# Patient Record
Sex: FEMALE | Race: WHITE | Employment: OTHER | URBAN - METROPOLITAN AREA
[De-identification: names, ages, dates, MRNs, and addresses within clinical notes are randomized per-mention and may not be internally consistent; named-entity substitution may affect disease eponyms.]

---

## 2019-10-08 ENCOUNTER — APPOINTMENT (OUTPATIENT)
Dept: GENERAL RADIOLOGY | Age: 79
End: 2019-10-08
Payer: MEDICARE

## 2019-10-08 ENCOUNTER — HOSPITAL ENCOUNTER (OUTPATIENT)
Age: 79
Setting detail: OBSERVATION
Discharge: HOME | End: 2019-10-09
Attending: EMERGENCY MEDICINE | Admitting: INTERNAL MEDICINE
Payer: MEDICARE

## 2019-10-08 DIAGNOSIS — R55 SYNCOPE, UNSPECIFIED SYNCOPE TYPE: Primary | ICD-10-CM
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
## 2019-10-08 PROBLEM — R42 POSITIONAL LIGHTHEADEDNESS: Status: ACTIVE | Noted: 2019-10-08

## 2019-10-08 LAB
ALBUMIN SERPL-MCNC: 4.4 GM/DL (ref 3.4–5)
ALP BLD-CCNC: 82 IU/L (ref 40–128)
ALT SERPL-CCNC: 28 U/L (ref 10–40)
ANION GAP SERPL CALCULATED.3IONS-SCNC: 13 MMOL/L (ref 4–16)
AST SERPL-CCNC: 33 IU/L (ref 15–37)
BASOPHILS ABSOLUTE: 0.1 K/CU MM
BASOPHILS RELATIVE PERCENT: 0.7 % (ref 0–1)
BILIRUB SERPL-MCNC: 0.3 MG/DL (ref 0–1)
BUN BLDV-MCNC: 23 MG/DL (ref 6–23)
CALCIUM SERPL-MCNC: 9.6 MG/DL (ref 8.3–10.6)
CHLORIDE BLD-SCNC: 99 MMOL/L (ref 99–110)
CO2: 25 MMOL/L (ref 21–32)
CREAT SERPL-MCNC: 0.9 MG/DL (ref 0.6–1.1)
DIFFERENTIAL TYPE: ABNORMAL
EOSINOPHILS ABSOLUTE: 0.2 K/CU MM
EOSINOPHILS RELATIVE PERCENT: 2 % (ref 0–3)
GFR AFRICAN AMERICAN: >60 ML/MIN/1.73M2
GFR NON-AFRICAN AMERICAN: >60 ML/MIN/1.73M2
GLUCOSE BLD-MCNC: 163 MG/DL (ref 70–99)
HCT VFR BLD CALC: 38.6 % (ref 37–47)
HEMOGLOBIN: 12 GM/DL (ref 12.5–16)
IMMATURE NEUTROPHIL %: 0.3 % (ref 0–0.43)
LYMPHOCYTES ABSOLUTE: 1.9 K/CU MM
LYMPHOCYTES RELATIVE PERCENT: 24.4 % (ref 24–44)
MCH RBC QN AUTO: 31.4 PG (ref 27–31)
MCHC RBC AUTO-ENTMCNC: 31.1 % (ref 32–36)
MCV RBC AUTO: 101 FL (ref 78–100)
MONOCYTES ABSOLUTE: 0.6 K/CU MM
MONOCYTES RELATIVE PERCENT: 7.2 % (ref 0–4)
NUCLEATED RBC %: 0 %
PDW BLD-RTO: 12.5 % (ref 11.7–14.9)
PLATELET # BLD: 234 K/CU MM (ref 140–440)
PMV BLD AUTO: 11.4 FL (ref 7.5–11.1)
POTASSIUM SERPL-SCNC: 4 MMOL/L (ref 3.5–5.1)
PRO-BNP: 1022 PG/ML
RBC # BLD: 3.82 M/CU MM (ref 4.2–5.4)
SEGMENTED NEUTROPHILS ABSOLUTE COUNT: 5 K/CU MM
SEGMENTED NEUTROPHILS RELATIVE PERCENT: 65.4 % (ref 36–66)
SODIUM BLD-SCNC: 137 MMOL/L (ref 135–145)
TOTAL IMMATURE NEUTOROPHIL: 0.02 K/CU MM
TOTAL NUCLEATED RBC: 0 K/CU MM
TOTAL PROTEIN: 7.3 GM/DL (ref 6.4–8.2)
TROPONIN T: <0.01 NG/ML
WBC # BLD: 7.7 K/CU MM (ref 4–10.5)

## 2019-10-08 PROCEDURE — 71046 X-RAY EXAM CHEST 2 VIEWS: CPT

## 2019-10-08 PROCEDURE — G0378 HOSPITAL OBSERVATION PER HR: HCPCS

## 2019-10-08 PROCEDURE — 99284 EMERGENCY DEPT VISIT MOD MDM: CPT

## 2019-10-08 PROCEDURE — 2580000003 HC RX 258: Performed by: NURSE PRACTITIONER

## 2019-10-08 PROCEDURE — 80053 COMPREHEN METABOLIC PANEL: CPT

## 2019-10-08 PROCEDURE — 36415 COLL VENOUS BLD VENIPUNCTURE: CPT

## 2019-10-08 PROCEDURE — 84484 ASSAY OF TROPONIN QUANT: CPT

## 2019-10-08 PROCEDURE — 6370000000 HC RX 637 (ALT 250 FOR IP): Performed by: NURSE PRACTITIONER

## 2019-10-08 PROCEDURE — 85025 COMPLETE CBC W/AUTO DIFF WBC: CPT

## 2019-10-08 PROCEDURE — 83880 ASSAY OF NATRIURETIC PEPTIDE: CPT

## 2019-10-08 PROCEDURE — 93005 ELECTROCARDIOGRAM TRACING: CPT | Performed by: EMERGENCY MEDICINE

## 2019-10-08 RX ORDER — METOPROLOL SUCCINATE 50 MG/1
50 TABLET, EXTENDED RELEASE ORAL DAILY
COMMUNITY

## 2019-10-08 RX ORDER — LEVOTHYROXINE SODIUM 0.07 MG/1
75 TABLET ORAL DAILY
COMMUNITY

## 2019-10-08 RX ORDER — PRAVASTATIN SODIUM 40 MG
40 TABLET ORAL NIGHTLY
Status: DISCONTINUED | OUTPATIENT
Start: 2019-10-08 | End: 2019-10-09 | Stop reason: HOSPADM

## 2019-10-08 RX ORDER — NAPROXEN 375 MG/1
375 TABLET ORAL 2 TIMES DAILY PRN
COMMUNITY

## 2019-10-08 RX ORDER — SODIUM CHLORIDE 0.9 % (FLUSH) 0.9 %
10 SYRINGE (ML) INJECTION EVERY 12 HOURS SCHEDULED
Status: DISCONTINUED | OUTPATIENT
Start: 2019-10-08 | End: 2019-10-09 | Stop reason: HOSPADM

## 2019-10-08 RX ORDER — METOPROLOL SUCCINATE 50 MG/1
50 TABLET, EXTENDED RELEASE ORAL DAILY
Status: DISCONTINUED | OUTPATIENT
Start: 2019-10-09 | End: 2019-10-09 | Stop reason: HOSPADM

## 2019-10-08 RX ORDER — LOSARTAN POTASSIUM 50 MG/1
50 TABLET ORAL DAILY
COMMUNITY

## 2019-10-08 RX ORDER — CETIRIZINE HYDROCHLORIDE 10 MG/1
10 TABLET ORAL DAILY PRN
COMMUNITY

## 2019-10-08 RX ORDER — LEVOTHYROXINE SODIUM 0.07 MG/1
75 TABLET ORAL DAILY
Status: DISCONTINUED | OUTPATIENT
Start: 2019-10-09 | End: 2019-10-09 | Stop reason: HOSPADM

## 2019-10-08 RX ORDER — CETIRIZINE HYDROCHLORIDE 10 MG/1
10 TABLET ORAL DAILY PRN
Status: DISCONTINUED | OUTPATIENT
Start: 2019-10-08 | End: 2019-10-09 | Stop reason: HOSPADM

## 2019-10-08 RX ORDER — LOSARTAN POTASSIUM 50 MG/1
50 TABLET ORAL DAILY
Status: DISCONTINUED | OUTPATIENT
Start: 2019-10-09 | End: 2019-10-09 | Stop reason: HOSPADM

## 2019-10-08 RX ORDER — SODIUM CHLORIDE 0.9 % (FLUSH) 0.9 %
10 SYRINGE (ML) INJECTION PRN
Status: DISCONTINUED | OUTPATIENT
Start: 2019-10-08 | End: 2019-10-09 | Stop reason: HOSPADM

## 2019-10-08 RX ORDER — ONDANSETRON 2 MG/ML
4 INJECTION INTRAMUSCULAR; INTRAVENOUS EVERY 6 HOURS PRN
Status: DISCONTINUED | OUTPATIENT
Start: 2019-10-08 | End: 2019-10-09 | Stop reason: HOSPADM

## 2019-10-08 RX ORDER — PRAVASTATIN SODIUM 40 MG
40 TABLET ORAL NIGHTLY
COMMUNITY

## 2019-10-08 RX ORDER — DOCUSATE SODIUM 100 MG/1
100 CAPSULE, LIQUID FILLED ORAL 2 TIMES DAILY PRN
COMMUNITY

## 2019-10-08 RX ORDER — NAPROXEN 250 MG/1
375 TABLET ORAL 2 TIMES DAILY PRN
Status: DISCONTINUED | OUTPATIENT
Start: 2019-10-08 | End: 2019-10-09 | Stop reason: HOSPADM

## 2019-10-08 RX ORDER — DOCUSATE SODIUM 100 MG/1
100 CAPSULE, LIQUID FILLED ORAL 2 TIMES DAILY PRN
Status: DISCONTINUED | OUTPATIENT
Start: 2019-10-08 | End: 2019-10-09 | Stop reason: HOSPADM

## 2019-10-08 RX ADMIN — Medication 10 ML: at 23:26

## 2019-10-08 RX ADMIN — PRAVASTATIN SODIUM 40 MG: 40 TABLET ORAL at 23:25

## 2019-10-08 SDOH — HEALTH STABILITY: MENTAL HEALTH: HOW OFTEN DO YOU HAVE A DRINK CONTAINING ALCOHOL?: NEVER

## 2019-10-08 ASSESSMENT — PAIN SCALES - GENERAL: PAINLEVEL_OUTOF10: 0

## 2019-10-09 ENCOUNTER — APPOINTMENT (OUTPATIENT)
Dept: ULTRASOUND IMAGING | Age: 79
End: 2019-10-09
Payer: MEDICARE

## 2019-10-09 VITALS
RESPIRATION RATE: 21 BRPM | OXYGEN SATURATION: 97 % | TEMPERATURE: 97.9 F | BODY MASS INDEX: 27.64 KG/M2 | SYSTOLIC BLOOD PRESSURE: 115 MMHG | HEIGHT: 62 IN | HEART RATE: 78 BPM | WEIGHT: 150.2 LBS | DIASTOLIC BLOOD PRESSURE: 46 MMHG

## 2019-10-09 LAB
LV EF: 53 %
LVEF MODALITY: NORMAL

## 2019-10-09 PROCEDURE — 6360000002 HC RX W HCPCS: Performed by: NURSE PRACTITIONER

## 2019-10-09 PROCEDURE — 93306 TTE W/DOPPLER COMPLETE: CPT

## 2019-10-09 PROCEDURE — 2580000003 HC RX 258: Performed by: NURSE PRACTITIONER

## 2019-10-09 PROCEDURE — 99218 PR INITIAL OBSERVATION CARE/DAY 30 MINUTES: CPT | Performed by: INTERNAL MEDICINE

## 2019-10-09 PROCEDURE — G0378 HOSPITAL OBSERVATION PER HR: HCPCS

## 2019-10-09 PROCEDURE — 93880 EXTRACRANIAL BILAT STUDY: CPT

## 2019-10-09 PROCEDURE — 94761 N-INVAS EAR/PLS OXIMETRY MLT: CPT

## 2019-10-09 PROCEDURE — 93010 ELECTROCARDIOGRAM REPORT: CPT | Performed by: INTERNAL MEDICINE

## 2019-10-09 PROCEDURE — 96372 THER/PROPH/DIAG INJ SC/IM: CPT

## 2019-10-09 PROCEDURE — 6370000000 HC RX 637 (ALT 250 FOR IP): Performed by: NURSE PRACTITIONER

## 2019-10-09 RX ADMIN — METOPROLOL SUCCINATE 50 MG: 50 TABLET, EXTENDED RELEASE ORAL at 09:55

## 2019-10-09 RX ADMIN — ENOXAPARIN SODIUM 40 MG: 40 INJECTION SUBCUTANEOUS at 09:55

## 2019-10-09 RX ADMIN — LEVOTHYROXINE SODIUM 75 MCG: 75 TABLET ORAL at 06:11

## 2019-10-09 RX ADMIN — LOSARTAN POTASSIUM 50 MG: 50 TABLET, FILM COATED ORAL at 09:55

## 2019-10-09 RX ADMIN — Medication 10 ML: at 09:56

## 2019-10-09 ASSESSMENT — PAIN SCALES - GENERAL
PAINLEVEL_OUTOF10: 0
PAINLEVEL_OUTOF10: 0

## 2019-10-10 LAB
EKG ATRIAL RATE: 68 BPM
EKG DIAGNOSIS: NORMAL
EKG P AXIS: 71 DEGREES
EKG P-R INTERVAL: 134 MS
EKG Q-T INTERVAL: 398 MS
EKG QRS DURATION: 96 MS
EKG QTC CALCULATION (BAZETT): 423 MS
EKG R AXIS: -28 DEGREES
EKG T AXIS: -8 DEGREES
EKG VENTRICULAR RATE: 68 BPM

## 2019-10-17 ENCOUNTER — TELEPHONE (OUTPATIENT)
Dept: CARDIOLOGY CLINIC | Age: 79
End: 2019-10-17

## 2019-10-21 ENCOUNTER — NURSE ONLY (OUTPATIENT)
Dept: CARDIOLOGY CLINIC | Age: 79
End: 2019-10-21

## 2019-10-21 VITALS — DIASTOLIC BLOOD PRESSURE: 70 MMHG | SYSTOLIC BLOOD PRESSURE: 100 MMHG | HEART RATE: 92 BPM

## 2019-10-21 DIAGNOSIS — R55 SYNCOPE, UNSPECIFIED SYNCOPE TYPE: Primary | ICD-10-CM

## 2020-02-04 ENCOUNTER — HOSPITAL ENCOUNTER (OUTPATIENT)
Dept: PHYSICAL THERAPY | Age: 80
Setting detail: THERAPIES SERIES
Discharge: HOME | End: 2020-02-04
Payer: MEDICARE

## 2020-02-04 PROCEDURE — 97110 THERAPEUTIC EXERCISES: CPT

## 2020-02-04 PROCEDURE — 97161 PT EVAL LOW COMPLEX 20 MIN: CPT

## 2020-02-04 PROCEDURE — 97140 MANUAL THERAPY 1/> REGIONS: CPT

## 2020-02-04 ASSESSMENT — PAIN DESCRIPTION - FREQUENCY: FREQUENCY: CONTINUOUS

## 2020-02-04 ASSESSMENT — PAIN DESCRIPTION - PAIN TYPE: TYPE: ACUTE PAIN

## 2020-02-04 ASSESSMENT — PAIN DESCRIPTION - LOCATION: LOCATION: ARM;SHOULDER;HAND

## 2020-02-04 ASSESSMENT — PAIN DESCRIPTION - DESCRIPTORS: DESCRIPTORS: ACHING;BURNING;DULL

## 2020-02-04 ASSESSMENT — PAIN SCALES - GENERAL: PAINLEVEL_OUTOF10: 4

## 2020-02-04 ASSESSMENT — PAIN - FUNCTIONAL ASSESSMENT: PAIN_FUNCTIONAL_ASSESSMENT: PREVENTS OR INTERFERES WITH ALL ACTIVE AND SOME PASSIVE ACTIVITIES

## 2020-02-04 ASSESSMENT — PAIN DESCRIPTION - PROGRESSION: CLINICAL_PROGRESSION: NOT CHANGED

## 2020-02-04 ASSESSMENT — PAIN DESCRIPTION - ORIENTATION: ORIENTATION: RIGHT

## 2020-02-04 ASSESSMENT — PAIN DESCRIPTION - ONSET: ONSET: SUDDEN

## 2020-02-04 NOTE — PLAN OF CARE
Outpatient Physical Therapy           [facility]           [x] Phone: [phone]   Fax: [phone]  [place]           [] Phone: [phone]   Fax: [phone]     To: Referring Practitioner: Jb Wright    From: German Ziegler PT     Patient: Ivy Bernardo        : 1940  Diagnosis: Diagnosis: R shoulder and arm pain   Treatment Diagnosis: Treatment Diagnosis: R UE pain, stiffness, weakness   Date: 2020    Physical Therapy Certification/Re-Certification Form  Dear Dr. Russell Marking,   The following patient has been evaluated for physical therapy services and for therapy to continue, insurance requires physician review of the treatment plan initially and every 90 days. Please review the attached evaluation and/or summary of the patient's plan of care, and verify that you agree therapy should continue by signing the attached document and sending it back to our office. Assessment:    Assessment: Pt is a 77 yo female who presents w/ R shoulder/arm/hand pain. She demonstrates limited and painful ROM w/ weakness also and + impingment signs as well. These limitations are affecting her ability to perform her usual activity and she will benefit from PT to help restore ROM, strength and activity w/o pain. Prior to 6 weeks ago she had min to no pain in R UE. Patient agrees with established plan of care and assisted in the development of their short term and long term goals. Patient had no adverse reaction with initial treatment and there are no barriers to learning. Demonstrates no mental or cognitive disorder.       Plan of Care/Treatment to date:  [x] Therapeutic Exercise  [x] Modalities:  [x] Therapeutic Activity     [] Ultrasound  [x] Electrical Stimulation  [] Gait Training      [] Cervical Traction [] Lumbar Traction  [x] Neuromuscular Re-education    [x] Cold/hotpack [] Iontophoresis   [x] Instruction in HEP      [] Vasopneumatic     [x] Manual Therapy               [] Aquatic Therapy Other:    ? Frequency/Duration:  # Days per week: [] 1 day # Weeks: [] 1 week [] 5 weeks     [x] 2 days? [] 2 weeks [x] 6 weeks     [] 3 days   [] 3 weeks [] 7 weeks     [] 4 days   [] 4 weeks [] 8 weeks         [] 9 weeks [] 10 weeks         [] 11 weeks [] 12 weeks    Rehab Potential/Progress: [] Excellent [x] Good [] Fair  [] Poor     Goals:      Short term goals  Time Frame for Short term goals: 3 weeks, 2/21/20  Short term goal 1: Pt will be able to report at least 25% reduction in pain  Short term goal 2: Pt will be able to raise arm > 120° w/ min pain   Long term goals  Time Frame for Long term goals : 6 weeks, 3/13/20  Long term goal 1: Pt will be indpendent w/ HEP and able to continue to manage her pain after PT  Long term goal 2: Pt will be able to return to bowling w/o pain   Long term goal 3: Pt will have full strength R UE w/o pain for prior activty   Long term goal 4: Pt will have full AROM for all reaching activity w/o pain      Electronically signed by:  German Ziegler, PT, MPT, ATC  2/4/2020, 3:10 PM    2/4/2020, 3:10 PM  If you have any questions or concerns, please don't hesitate to call.   Thank you for your referral.      Physician Signature:________________________________Date:_________ TIME: _____  By signing above, therapists plan is approved by physician

## 2020-02-04 NOTE — FLOWSHEET NOTE
[x] Cold/hotpack [] Iontophoresis   [x] Instruction in HEP      [] Vasopneumatic   [] Dry Needling    [x] Manual Therapy               [] Aquatic Therapy              Electronically signed by:  German Ziegler PT, MPT, ATC  2/4/2020, 3:10 PM    2/4/2020, 3:12 PM

## 2020-02-04 NOTE — PROGRESS NOTES
Physical Therapy  Initial Assessment  Date: 2020  Patient Name: Cyn Louie  MRN: [id number]  : 1940     Treatment Diagnosis: R UE pain, stiffness, weakness    Restrictions  Position Activity Restriction  Other position/activity restrictions: none    Subjective   General  Chart Reviewed: Yes  Patient assessed for rehabilitation services?: Yes  Additional Pertinent Hx: 2.5 yrs ago, had pain in R arm from bowling injury, started again about 6 weeks ago w/o known LAURIE, has difficutly rolling in bed either way. Did go to ER 2 yrs ago and Dx w/ a strain of some sort and it got better, but now hurting again. xrays show OA  Referring Practitioner: Haim Rodriguez  Diagnosis: R shoulder and arm pain  General Comment  Comments: denies Hx neck issue, has some sort of heart implant--pt reports like a pacemaker but isn't one, but \"they monitor it\". Pt is R handed  PT Visit Information  PT Insurance Information: [employer]  Subjective  Subjective: pain is increased w/ lifting, can't reach up, getting coat on, sleep is mostly ok if doesn't move on it. Gets relief w/ rest adn heat helps some, but over did it and burned arm so hasn't done lately. Prescription cream helps a little  Pain Screening  Patient Currently in Pain: Yes  Pain Assessment  Pain Assessment: 0-10  Pain Level: 4(max pain 8-9/10)  Patient's Stated Pain Goal: 1  Pain Type: Acute pain  Pain Location: Arm; Shoulder;Hand(does have some hand tingling.)  Pain Orientation: Right  Pain Descriptors: Aching;Burning;Dull  Pain Frequency: Continuous  Pain Onset: Sudden  Clinical Progression: Not changed  Functional Pain Assessment: Prevents or interferes with all active and some passive activities  Vital Signs  Patient Currently in Pain: Yes    Vision/Hearing  Vision  Vision: Impaired  Hearing  Hearing: Exceptions to [facility]  Hearing Exceptions: Hard of hearing/hearing concerns    Orientation  Orientation  Overall Orientation Status: Within Normal Limits    Social/Functional History  Social/Functional History  Lives With: Spouse  Type of Home: House  Occupation: Retired  Leisure & Hobbies: bowling,     Objective     Observation/Palpation  Posture: Fair  Palpation: TTP all around shoulder but worst in posterior shoulder currently. PROM RUE (degrees)  RUE General PROM: supine shoulder flex 145 min pain, abd 158, IR 66, ER 73 all mild pain   AROM RUE (degrees)  RUE General AROM: seated shoulder flex 85, abd 90 (makes hand tingle), ER scratch to just behind ear, IR to L2-3, most pain is abd and flex    Joint Mobility  ROM RUE: mild capsular hypombility noted inf and posterior    Strength RUE  Comment: shoulder flex 4-/5 w/ pain, abd 4/5 less pain, bicep adn tricep WFL, ER pain 4-/5, IR 4+/5  Strength LUE  Strength LUE: WFL     Additional Measures  Special Tests: neg cerv comp/distration and Spurling, mild impingment more abd plane w/ Packer, + neer and cross over, Neg Neural tension tests  Sensation  Overall Sensation Status: Impaired  Light Touch: Partial deficits in the RUE(hyper on R delt)     Assessment   Conditions Requiring Skilled Therapeutic Intervention  Body structures, Functions, Activity limitations: Decreased functional mobility ; Decreased ADL status; Decreased ROM; Decreased strength; Increased pain;Decreased high-level IADLs;Decreased posture  Assessment: Pt is a 79 yo female who presents w/ R shoulder/arm/hand pain. She demonstrates limited and painful ROM w/ weakness also and + impingment signs as well. These limitations are affecting her ability to perform her usual activity and she will benefit from PT to help restore ROM, strength and activity w/o pain. Prior to 6 weeks ago she had min to no pain in R UE. Patient agrees with established plan of care and assisted in the development of their short term and long term goals. Patient had no adverse reaction with initial treatment and there are no barriers to learning.  Demonstrates no mental or cognitive disorder. Treatment Diagnosis: R UE pain, stiffness, weakness  Prognosis: Good  Decision Making: Low Complexity  Barriers to Learning: none  REQUIRES PT FOLLOW UP: Yes  Treatment Initiated : see flow sheet         Plan   Plan  Times per week: 2x  Plan weeks: 6w  Specific instructions for Next Treatment: Continue P-AAROM prn, advance to AROM as able, progress scap strength and RTC as tolerated, pain control prn   Current Treatment Recommendations: Strengthening, ROM, Functional Mobility Training, Manual Therapy - Soft Tissue Mobilization, Home Exercise Program, Modalities, Pain Management, Neuromuscular Re-education, Manual Therapy - Joint Manipulation, Patient/Caregiver Education & Training       OutComes Score      DASH 36%     Goals  Short term goals  Time Frame for Short term goals: 3 weeks, 2/21/20  Short term goal 1: Pt will be able to report at least 25% reduction in pain  Short term goal 2: Pt will be able to raise arm > 120° w/ min pain   Long term goals  Time Frame for Long term goals : 6 weeks, 3/13/20  Long term goal 1: Pt will be indpendent w/ HEP and able to continue to manage her pain after PT  Long term goal 2: Pt will be able to return to bowling w/o pain   Long term goal 3: Pt will have full strength R UE w/o pain for prior activty   Long term goal 4: Pt will have full AROM for all reaching activity w/o pain  Patient Goals   Patient goals : find out what is causing pain so can help make it better.             Jazmin Sarmiento, PT   PT, MPT, ATC     2/4/2020, 3:09 PM

## 2020-02-11 ENCOUNTER — HOSPITAL ENCOUNTER (OUTPATIENT)
Dept: PHYSICAL THERAPY | Age: 80
Setting detail: THERAPIES SERIES
Discharge: HOME | End: 2020-02-11
Payer: MEDICARE

## 2020-02-11 PROCEDURE — 97140 MANUAL THERAPY 1/> REGIONS: CPT

## 2020-02-11 PROCEDURE — 97535 SELF CARE MNGMENT TRAINING: CPT

## 2020-02-11 PROCEDURE — 97110 THERAPEUTIC EXERCISES: CPT

## 2020-02-11 NOTE — FLOWSHEET NOTE
Outpatient Physical Therapy  [place]           [x] Phone: [phone]   Fax: [phone]  [place]           [] Phone: [phone]   Fax: [phone]        Physical Therapy Daily Treatment Note  Date:  2020    Patient Name:  Jason Cheatham    :  1940  MRN: [id number]  Restrictions/Precautions: Other position/activity restrictions: none  Diagnosis:   Diagnosis: R shoulder and arm pain  Date of Injury/Surgery:   Treatment Diagnosis: Treatment Diagnosis: R UE pain, stiffness, weakness    Insurance/Certification information: PT Insurance Information:  [address] Medicare   Referring Physician:  Referring Practitioner: Racheal Ayush  Next Doctor Visit:    Plan of care signed (Y/N):  pending  Outcome Measure: DASH 36%  Visit# / total visits:    POC  Pain level: 2/10 at rest, 4/10 with movement       Goals:        Short term goals  Time Frame for Short term goals: 3 weeks, 20  Short term goal 1: Pt will be able to report at least 25% reduction in pain  Short term goal 2: Pt will be able to raise arm > 120° w/ min pain   Long term goals  Time Frame for Long term goals : 6 weeks, 3/13/20  Long term goal 1: Pt will be indpendent w/ HEP and able to continue to manage her pain after PT  Long term goal 2: Pt will be able to return to bowling w/o pain   Long term goal 3: Pt will have full strength R UE w/o pain for prior activty   Long term goal 4: Pt will have full AROM for all reaching activity w/o pain    Summary of Evaluation: Assessment: Pt is a 79 yo female who presents w/ R shoulder/arm/hand pain. She demonstrates limited and painful ROM w/ weakness also and + impingment signs as well. These limitations are affecting her ability to perform her usual activity and she will benefit from PT to help restore ROM, strength and activity w/o pain. Prior to 6 weeks ago she had min to no pain in R UE. Subjective:  Lissa arrives to therapy stating that the shoulder just hurts.  She states to start; however, after some education on therapy and what might be causing her pain she seems less agitated. She tolerated all activities in the clinic well; however, her session was terminated early today due to high BP readings.        Plan for Next Session:   Continue P-AAROM prn, advance to AROM as able, progress scap strength and RTC as tolerated, pain control prn       Time In / Time Out:  7853/3460          Timed Code/Total Treatment Minutes:  43' 1 man 10' 1 ADL 10' 1 TE 22'       Next Progress Note due:  Visit 10      Plan of Care Interventions:  [x] Therapeutic Exercise  [x] Modalities:  [x] Therapeutic Activity     [] Ultrasound  [x] Estim  [] Gait Training      [] Cervical Traction [] Lumbar Traction  [x] Neuromuscular Re-education    [x] Cold/hotpack [] Iontophoresis   [x] Instruction in HEP      [] Vasopneumatic   [] Dry Needling    [x] Manual Therapy               [] Aquatic Therapy              Electronically signed by:  Althea Asencio, DENNIS      2/11/2020, 4:57 PM

## 2020-02-13 ENCOUNTER — HOSPITAL ENCOUNTER (OUTPATIENT)
Dept: PHYSICAL THERAPY | Age: 80
Setting detail: THERAPIES SERIES
Discharge: HOME | End: 2020-02-13
Payer: MEDICARE

## 2020-02-13 PROCEDURE — 97110 THERAPEUTIC EXERCISES: CPT

## 2020-02-13 PROCEDURE — 97140 MANUAL THERAPY 1/> REGIONS: CPT

## 2020-02-13 NOTE — FLOWSHEET NOTE
Outpatient Physical Therapy  [place]           [x] Phone: [phone]   Fax: [phone]  [place]           [] Phone: [phone]   Fax: [phone]        Physical Therapy Daily Treatment Note  Date:  2020    Patient Name:  Jeremiah Moses    :  1940  MRN: [id number]  Restrictions/Precautions: Other position/activity restrictions: none  Diagnosis:   Diagnosis: R shoulder and arm pain  Date of Injury/Surgery:   Treatment Diagnosis: Treatment Diagnosis: R UE pain, stiffness, weakness    Insurance/Certification information: PT Insurance Information:  [facility] Medicare   Referring Physician:  Referring Practitioner: Yen Escobedo  Next Doctor Visit:    Plan of care signed (Y/N):  Pending - resent   Outcome Measure: DASH 36%  Visit# / total visits:   3/12 POC  Pain level: 2/10 at rest, 4/10 with movement       Goals:        Short term goals  Time Frame for Short term goals: 3 weeks, 20  Short term goal 1: Pt will be able to report at least 25% reduction in pain  Short term goal 2: Pt will be able to raise arm > 120° w/ min pain Not met   Long term goals  Time Frame for Long term goals : 6 weeks, 3/13/20  Long term goal 1: Pt will be indpendent w/ HEP and able to continue to manage her pain after PT  Long term goal 2: Pt will be able to return to bowling w/o pain   Long term goal 3: Pt will have full strength R UE w/o pain for prior activty   Long term goal 4: Pt will have full AROM for all reaching activity w/o pain    Summary of Evaluation: Assessment: Pt is a 79 yo female who presents w/ R shoulder/arm/hand pain. She demonstrates limited and painful ROM w/ weakness also and + impingment signs as well. These limitations are affecting her ability to perform her usual activity and she will benefit from PT to help restore ROM, strength and activity w/o pain. Prior to 6 weeks ago she had min to no pain in R UE.           Subjective:  Lissa arrives to therapy stating that the pain is less in the shoulder but shoulder feels really weak. Still hurts quite a bit when she tries to lift something heavier. Felt fine after last session. Any changes in Ambulatory Summary Sheet? None        Objective:  Requires a target for S/L ER. Cues to stabilize arm when achieving 90° abduction in S/L position, loses control around this part of the motion. Little scapular movement noted with table slides and quite a bit of crepitus noted here, required scapular assist during these exercises. BP  Start: 139/89 L arm,   Mid:  132/83 L arm,  End: 134/70 HR 73           Exercises: (No more than 4 columns) CHECK BP!! Exercise/Equipment 2/4/20 2/11/2020 2/13/2020           WARM UP                     TABLE      Supine cane flex  4-5x  15* 2*10   Supine cane ER at 90° 5x 15* 2*10   SA punch   Next    Arm hold vs. RS at 90°? Next    S/L ER    2*10 target   S/L abduction    2*10            STANDING      Table slide, flex  CW/CCW 10x  10xea  - Incline 10* ea   scap squeezes arms down  Elbows bent  10x  10x -   15*  15*                                      PROPRIOCEPTION                                    MODALITIES                      Other Therapeutic Activities/Education:         Home Exercise Program:  Issued, practiced and pt demo ability to perform 2/4/2020         Manual Treatments:  [address] inferior and posterior mobs, PROM to R shoulder to manuel, 10'      Modalities:  none      Communication with other providers:  POC faxed 2/4/20      Assessment:      Lissa tolerated today's session well. Her BP was monitored closely and did well throughout the session without rising after performing exercises. Her PROM is nearing WNL with no pain at end ranges. She is still limited with elevation of her arm to 90° even without compensation indicating weak RTC musculature. She will continue to benefit from skilled PT services in order to appropriately progress strengthening exercises for return to PLOF. Plan for Next Session:   Add scapular mobilizations next session      Time In / Time Out:  1350/1435          Timed Code/Total Treatment Minutes:  39' 1 man 10' 2 TE 35'      Next Progress Note due:  Visit 10      Plan of Care Interventions:  [x] Therapeutic Exercise  [x] Modalities:  [x] Therapeutic Activity     [] Ultrasound  [x] Estim  [] Gait Training      [] Cervical Traction [] Lumbar Traction  [x] Neuromuscular Re-education    [x] Cold/hotpack [] Iontophoresis   [x] Instruction in HEP      [] Vasopneumatic   [] Dry Needling    [x] Manual Therapy               [] Aquatic Therapy              Electronically signed by:  Orlando Hackett PTA      2/13/2020, 11:12 AM

## 2020-02-17 ENCOUNTER — HOSPITAL ENCOUNTER (OUTPATIENT)
Dept: PHYSICAL THERAPY | Age: 80
Setting detail: THERAPIES SERIES
Discharge: HOME | End: 2020-02-17
Payer: MEDICARE

## 2020-02-17 PROCEDURE — 97140 MANUAL THERAPY 1/> REGIONS: CPT

## 2020-02-17 PROCEDURE — 97110 THERAPEUTIC EXERCISES: CPT

## 2020-02-17 PROCEDURE — 97112 NEUROMUSCULAR REEDUCATION: CPT

## 2020-02-17 NOTE — FLOWSHEET NOTE
Outpatient Physical Therapy  [place]           [x] Phone: [phone]   Fax: [phone]  [facility]           [] Phone: [phone]   Fax: [phone]        Physical Therapy Daily Treatment Note  Date:  2020    Patient Name:  Ivy Bernardo    :  1940  MRN: [id number]  Restrictions/Precautions: Other position/activity restrictions: none  Diagnosis:   Diagnosis: R shoulder and arm pain  Date of Injury/Surgery:   Treatment Diagnosis: Treatment Diagnosis: R UE pain, stiffness, weakness    Insurance/Certification information: PT Insurance Information:  [facility] Medicare   Referring Physician:  Referring Practitioner: Jb Wright  Next Doctor Visit:    Plan of care signed (Y/N):  Pending - resent   Outcome Measure: DASH 36%  Visit# / total visits:   3/12 POC  Pain level: 5/10     Goals:        Short term goalsba  Time Frame for Short term goals: 3 weeks, 20  Short term goal 1: Pt will be able to report at least 25% reduction in pain  Short term goal 2: Pt will be able to raise arm > 120° w/ min pain Not met   Long term goals  Time Frame for Long term goals : 6 weeks, 3/13/20  Long term goal 1: Pt will be indpendent w/ HEP and able to continue to manage her pain after PT  Long term goal 2: Pt will be able to return to bowling w/o pain   Long term goal 3: Pt will have full strength R UE w/o pain for prior activty   Long term goal 4: Pt will have full AROM for all reaching activity w/o pain    Summary of Evaluation: Assessment: Pt is a 79 yo female who presents w/ R shoulder/arm/hand pain. She demonstrates limited and painful ROM w/ weakness also and + impingment signs as well. These limitations are affecting her ability to perform her usual activity and she will benefit from PT to help restore ROM, strength and activity w/o pain. Prior to 6 weeks ago she had min to no pain in R UE. Subjective:  Pt stated that her pain was about 5-6/ 10 today.  Pt stated that she hasn't been

## 2020-02-20 ENCOUNTER — HOSPITAL ENCOUNTER (OUTPATIENT)
Dept: PHYSICAL THERAPY | Age: 80
Setting detail: THERAPIES SERIES
Discharge: HOME | End: 2020-02-20
Payer: MEDICARE

## 2020-02-20 PROCEDURE — 97140 MANUAL THERAPY 1/> REGIONS: CPT

## 2020-02-20 PROCEDURE — 97110 THERAPEUTIC EXERCISES: CPT

## 2020-02-20 NOTE — FLOWSHEET NOTE
Outpatient Physical Therapy  [place]           [x] Phone: [phone]   Fax: [phone]  [place]           [] Phone: [phone]   Fax: [phone]        Physical Therapy Daily Treatment Note  Date:  2020    Patient Name:  Anshul Schwab    :  1940  MRN: [id number]  Restrictions/Precautions: Other position/activity restrictions: none  Diagnosis:   Diagnosis: R shoulder and arm pain  Date of Injury/Surgery:   Treatment Diagnosis: Treatment Diagnosis: R UE pain, stiffness, weakness    Insurance/Certification information: PT Insurance Information:  [facility] Medicare   Referring Physician:  Referring Practitioner: Adelita Riddle  Next Doctor Visit:    Plan of care signed (Y/N):  Pending - resent   Outcome Measure: DASH 36%  Visit# / total visits:    POC  Pain level: 4/10 with raising the arm     Goals:        Short term goalsba  Time Frame for Short term goals: 3 weeks, 20  Short term goal 1: Pt will be able to report at least 25% reduction in pain Met   Short term goal 2: Pt will be able to raise arm > 120° w/ min pain Not met   Long term goals  Time Frame for Long term goals : 6 weeks, 3/13/20  Long term goal 1: Pt will be indpendent w/ HEP and able to continue to manage her pain after PT   Long term goal 2: Pt will be able to return to bowling w/o pain   Long term goal 3: Pt will have full strength R UE w/o pain for prior activty Not met   Long term goal 4: Pt will have full AROM for all reaching activity w/o pain Not met     Summary of Evaluation: Assessment: Pt is a 77 yo female who presents w/ R shoulder/arm/hand pain. She demonstrates limited and painful ROM w/ weakness also and + impingment signs as well. These limitations are affecting her ability to perform her usual activity and she will benefit from PT to help restore ROM, strength and activity w/o pain. Prior to 6 weeks ago she had min to no pain in R UE.           Subjective:  Lissa arrives to tolerate exercises in the clinic. She has made improvements in AROM into abduction and IR and has less pain with ROM. Her PROM is nearly full in all directions indicating weakness in the RTC is limiting her active movement. She will continue to benefit from skilled PT services in order to appropriately address strength deficits.      End session pain: 0/10 just tired             Plan for Next Session:         Time In / Time Out:  1720/1755          Timed Code/Total Treatment Minutes: 28' 1 man 10' 1 TE 25'     Next Progress Note due:  Visit 10      Plan of Care Interventions:  [x] Therapeutic Exercise  [x] Modalities:  [x] Therapeutic Activity     [] Ultrasound  [x] Estim  [] Gait Training      [] Cervical Traction [] Lumbar Traction  [x] Neuromuscular Re-education    [x] Cold/hotpack [] Iontophoresis   [x] Instruction in HEP      [] Vasopneumatic   [] Dry Needling    [x] Manual Therapy               [] Aquatic Therapy              Electronically signed by:  Sirisha Roger PTA          2/20/2020,11:28 AM

## 2020-02-25 ENCOUNTER — HOSPITAL ENCOUNTER (OUTPATIENT)
Dept: PHYSICAL THERAPY | Age: 80
Setting detail: THERAPIES SERIES
Discharge: HOME | End: 2020-02-25
Payer: MEDICARE

## 2020-02-25 PROCEDURE — 97110 THERAPEUTIC EXERCISES: CPT

## 2020-02-25 PROCEDURE — 97140 MANUAL THERAPY 1/> REGIONS: CPT

## 2020-02-25 NOTE — FLOWSHEET NOTE
Outpatient Physical Therapy  [place]           [x] Phone: [phone]   Fax: [phone]  [place]           [] Phone: [phone]   Fax: [phone]        Physical Therapy Daily Treatment Note  Date:  2020    Patient Name:  Caterina Spence    :  1940  MRN: [id number]  Restrictions/Precautions: Other position/activity restrictions: none  Diagnosis:   Diagnosis: R shoulder and arm pain  Date of Injury/Surgery:   Treatment Diagnosis: Treatment Diagnosis: R UE pain, stiffness, weakness    Insurance/Certification information: PT Insurance Information:  [facility] [address] [facility] [address] Medicare   Referring Physician:  Referring Practitioner: Ricardo Leigh  Next Doctor Visit:    Plan of care signed (Y/N):  Pending - resent ,   Outcome Measure: DASH 36%  Visit# / total visits:    POC  Pain level: 5/10 with raising the arm     Goals:        Short term goalsba  Time Frame for Short term goals: 3 weeks, 20  Short term goal 1: Pt will be able to report at least 25% reduction in pain Met   Short term goal 2: Pt will be able to raise arm > 120° w/ min pain Not met   Long term goals  Time Frame for Long term goals : 6 weeks, 3/13/20  Long term goal 1: Pt will be indpendent w/ HEP and able to continue to manage her pain after PT   Long term goal 2: Pt will be able to return to bowling w/o pain   Long term goal 3: Pt will have full strength R UE w/o pain for prior activty Not met   Long term goal 4: Pt will have full AROM for all reaching activity w/o pain Not met     Summary of Evaluation: Assessment: Pt is a 79 yo female who presents w/ R shoulder/arm/hand pain. She demonstrates limited and painful ROM w/ weakness also and + impingment signs as well. These limitations are affecting her ability to perform her usual activity and she will benefit from PT to help restore ROM, strength and activity w/o pain. Prior to 6 weeks ago she had min to no pain in R UE.           Subjective:    Pt reports min improvement so far w/ PT. Concerned about pinched nerve d/t some sensitivity in index finger. She has no medical Hx of neck pain other than occasional aches and notes bony rubbing feeling in neck w/ ROM. Any changes in Ambulatory Summary Sheet? None        Objective: Cervical rotation R limited vs L both w/ pulling on R side. Cervical flex WNL, ext [place] w/ mild pulling. Pt has some decreased sensation to light touch R delt but increased sensitivity medial forearm and index finger. She has some periodic increase of numbness and tingling in hand w/ Rx as well, but no clear cervical issue and neural tension testing is negative but pt thinks she's has some carpal tunnel possible. Pt did struggle some w/ circles on table, did some pendulum in between which helped. Some UT compensation noted. BP start of session 140/91   AROM   Seated after Rx Flexion 94°, no pain just feels weak. PROM WNL all directions     BP post exercise 132/79, HR 55    Exercises: (No more than 4 columns) CHECK BP!! Exercise/Equipment 2/13/2020 2/17/2020 2/20/2020 2/25/20  #5            WARM UP       UBE    2'/2' f/b @ 120 --          TABLE       Supine cane flex  2*10 10x2 - No cane, AROM 10x   Supine cane ER at 90° 2*10 10x2 no cane 45° abd - --   SA punch Next  10x2 chest press w/ cane  10x punches  Pain w/SA 2*10 with cane press with mini serratus punch at the end  2x10 with cane press with mini serratus punch at the end    Concentric circles   10x ea dir 10* ea dir  --   Arm hold vs. RS at 90°?  Next  30\" x 2 2*30\"  In S/L today flex plane and abd plane 30\" ea   S/L ER  2*10 target 10x2 2*10 10x2   S/L abduction  2*10 10x2 2*10 10x2             STANDING       Table slide, flex  CW/CCW Incline 10* ea Incline 10x2 ea Incline 2*10 ea Incline 10x2 ea   scap squeezes arms down  Elbows bent  15*  15* 10x2  10x2 2*10  2*10 20x  20x                                           PROPRIOCEPTION MODALITIES                         Other Therapeutic Activities/Education:         Home Exercise Program:   practiced and pt demo ability to perform 2/17/2020         Manual Treatments:  [address] inferior and posterior mobs, PROM to R shoulder to manuel (trial of some cervical traction w/ min change)      Modalities:  none      Communication with other providers:  POC faxed 2/4/20      Assessment: Lissa is at times reporting improvement and other times she is not. She has some other issues possible confusing the situation too. She has made improvements in AROM into abduction and IR and has less pain with ROM. Her PROM is nearly full in all directions indicating weakness in the RTC is limiting her active movement, but she continues w/ pain and more so with weakness. She will continue to benefit from skilled PT services in order to appropriately address strength deficits.      End session pain: 0/10 just tired             Plan for Next Session:  Continue to progress strength and emphasis mechanics, update HEP     Time In / Time Out:  1500/1550         Timed Code/Total Treatment Minutes:  50/50     1 man 8' 2 TE 36'     Next Progress Note due:  Visit 10      Plan of Care Interventions:  [x] Therapeutic Exercise  [x] Modalities:  [x] Therapeutic Activity     [] Ultrasound  [x] Estim  [] Gait Training      [] Cervical Traction [] Lumbar Traction  [x] Neuromuscular Re-education    [x] Cold/hotpack [] Iontophoresis   [x] Instruction in HEP      [] Vasopneumatic   [] Dry Needling    [x] Manual Therapy               [] Aquatic Therapy              Electronically signed by:  Fernando Teresa, PT, MPT, ATC     2/25/2020, 3:56 PM

## 2020-02-27 ENCOUNTER — HOSPITAL ENCOUNTER (OUTPATIENT)
Dept: PHYSICAL THERAPY | Age: 80
Setting detail: THERAPIES SERIES
Discharge: HOME | End: 2020-02-27
Payer: MEDICARE

## 2020-02-27 PROCEDURE — 97140 MANUAL THERAPY 1/> REGIONS: CPT

## 2020-02-27 PROCEDURE — 97112 NEUROMUSCULAR REEDUCATION: CPT

## 2020-02-27 PROCEDURE — 97110 THERAPEUTIC EXERCISES: CPT

## 2020-02-27 NOTE — FLOWSHEET NOTE
Outpatient Physical Therapy  [place]           [x] Phone: [phone]   Fax: [phone]  [place]           [] Phone: [phone]   Fax: [phone]        Physical Therapy Daily Treatment Note  Date:  2020    Patient Name:  Kaylie Haskins    :  1940  MRN: [id number]  Restrictions/Precautions: Other position/activity restrictions: none  Diagnosis:   Diagnosis: R shoulder and arm pain  Date of Injury/Surgery:   Treatment Diagnosis: Treatment Diagnosis: R UE pain, stiffness, weakness    Insurance/Certification information: PT Insurance Information:  [facility] Medicare   Referring Physician:  Referring Practitioner: Kaila Boles  Next Doctor Visit:    Plan of care signed (Y/N):  Pending - resent ,   Outcome Measure: DASH 36%  Visit# / total visits:    POC  Pain level: 2/10 at rest, no worse with movement     Goals:        Short term goalsba  Time Frame for Short term goals: 3 weeks, 20  Short term goal 1: Pt will be able to report at least 25% reduction in pain Met   Short term goal 2: Pt will be able to raise arm > 120° w/ min pain Not met   Long term goals  Time Frame for Long term goals : 6 weeks, 3/13/20  Long term goal 1: Pt will be indpendent w/ HEP and able to continue to manage her pain after PT   Long term goal 2: Pt will be able to return to bowling w/o pain   Long term goal 3: Pt will have full strength R UE w/o pain for prior activty Not met   Long term goal 4: Pt will have full AROM for all reaching activity w/o pain Not met     Summary of Evaluation: Assessment: Pt is a 77 yo female who presents w/ R shoulder/arm/hand pain. She demonstrates limited and painful ROM w/ weakness also and + impingment signs as well. These limitations are affecting her ability to perform her usual activity and she will benefit from PT to help restore ROM, strength and activity w/o pain. Prior to 6 weeks ago she had min to no pain in R UE.           Subjective: Lissa added to address the lower trap weakness. She demonstrates instability in the shoulder with notable decrease in ability to control AROM flexion/abduction movements without cueing and concentration.      End session pain: 0/10 just tired             Plan for Next Session:  Continue to progress strength and emphasis mechanics, update HEP     Time In / Time Out:  1432/1520        Timed Code/Total Treatment Minutes:  50' 1 man 8' 1 NR 15' 1 TE 25'      Next Progress Note due:  Visit 10      Plan of Care Interventions:  [x] Therapeutic Exercise  [x] Modalities:  [x] Therapeutic Activity     [] Ultrasound  [x] Estim  [] Gait Training      [] Cervical Traction [] Lumbar Traction  [x] Neuromuscular Re-education    [x] Cold/hotpack [] Iontophoresis   [x] Instruction in HEP      [] Vasopneumatic   [] Dry Needling    [x] Manual Therapy               [] Aquatic Therapy              Electronically signed by:  Jonathon Paulino PTA        2/27/2020, 8:13 AM

## 2020-03-02 NOTE — FLOWSHEET NOTE
Patients Plan of Care was received and signed. Signed POC was scanned and placed in the patients chart.  02/25/2020  Tiffanie Nguyễn

## 2020-03-03 ENCOUNTER — HOSPITAL ENCOUNTER (OUTPATIENT)
Dept: PHYSICAL THERAPY | Age: 80
Setting detail: THERAPIES SERIES
Discharge: HOME | End: 2020-03-03
Payer: MEDICARE

## 2020-03-03 PROCEDURE — 97112 NEUROMUSCULAR REEDUCATION: CPT

## 2020-03-03 PROCEDURE — 97110 THERAPEUTIC EXERCISES: CPT

## 2020-03-03 NOTE — FLOWSHEET NOTE
Outpatient Physical Therapy  [place]           [x] Phone: [phone]   Fax: [phone]  [place]           [] Phone: [phone]   Fax: [phone]        Physical Therapy Daily Treatment Note  Date:  3/3/2020    Patient Name:  Jazmin Trammell    :  1940  MRN: [id number]  Restrictions/Precautions: Other position/activity restrictions: none  Diagnosis:   Diagnosis: R shoulder and arm pain  Date of Injury/Surgery:   Treatment Diagnosis: Treatment Diagnosis: R UE pain, stiffness, weakness    Insurance/Certification information: PT Insurance Information:  [facility] Medicare   Referring Physician:  Referring Practitioner: Beryl Winn  Next Doctor Visit:    Plan of care signed (Y/N):  Y  Outcome Measure: DASH 36%  Visit# / total visits:    POC  Pain level: 2/10 at rest, no worse with movement     Goals:        Short term goalsba  Time Frame for Short term goals: 3 weeks, 20  Short term goal 1: Pt will be able to report at least 25% reduction in pain Met   Short term goal 2: Pt will be able to raise arm > 120° w/ min pain Not met   Long term goals  Time Frame for Long term goals : 6 weeks, 3/13/20  Long term goal 1: Pt will be indpendent w/ HEP and able to continue to manage her pain after PT   Long term goal 2: Pt will be able to return to bowling w/o pain   Long term goal 3: Pt will have full strength R UE w/o pain for prior activty Not met   Long term goal 4: Pt will have full AROM for all reaching activity w/o pain Not met     Summary of Evaluation: Assessment: Pt is a 79 yo female who presents w/ R shoulder/arm/hand pain. She demonstrates limited and painful ROM w/ weakness also and + impingment signs as well. These limitations are affecting her ability to perform her usual activity and she will benefit from PT to help restore ROM, strength and activity w/o pain. Prior to 6 weeks ago she had min to no pain in R UE.           Subjective: Lissa arrives to therapy stating due to weakness and pain in the hands. She remains very weak in the RTC musculature causing her a great deal of difficulty raising her arm without compensation at the elbow and the shoulder.      End session pain: 0/10 just tired             Plan for Next Session:  Continue to progress strength and emphasis mechanics, update HEP     Time In / Time Out:  1430/1515        Timed Code/Total Treatment Minutes:  39' 2 NR 25' 1 TE 20'    Next Progress Note due:  Visit 10      Plan of Care Interventions:  [x] Therapeutic Exercise  [x] Modalities:  [x] Therapeutic Activity     [] Ultrasound  [x] Estim  [] Gait Training      [] Cervical Traction [] Lumbar Traction  [x] Neuromuscular Re-education    [x] Cold/hotpack [] Iontophoresis   [x] Instruction in HEP      [] Vasopneumatic   [] Dry Needling    [x] Manual Therapy               [] Aquatic Therapy              Electronically signed by:  Cesario Rojas PTA        3/3/2020, 8:19 AM

## 2020-03-05 ENCOUNTER — HOSPITAL ENCOUNTER (OUTPATIENT)
Dept: PHYSICAL THERAPY | Age: 80
Setting detail: THERAPIES SERIES
Discharge: HOME | End: 2020-03-05
Payer: MEDICARE

## 2020-03-05 PROCEDURE — 97112 NEUROMUSCULAR REEDUCATION: CPT

## 2020-03-05 PROCEDURE — 97110 THERAPEUTIC EXERCISES: CPT

## 2020-03-05 PROCEDURE — 97530 THERAPEUTIC ACTIVITIES: CPT

## 2020-03-05 NOTE — FLOWSHEET NOTE
that the shoulder is feeling really good, no pain really. Still having more trouble with her hand than anything else. Would like to be done next session. Any changes in Ambulatory Summary Sheet? None        Objective: AROM abduction 95° with mild shoulder hike. Requires some cueing for ER with band, weak here and can't go very far throughout the ROM. /81 HR 76           Exercises: (No more than 4 columns) CHECK BP!! Exercise/Equipment 2/27/2020 #6 3/3/2020 #7 3/5/2020           WARM UP      UBE  2'/2' @ 120 2'/2' @ 120 2'/2' @ 120         TABLE      Supine cane flex  10* 2*10 AROM 1# 2*10   SA punch 2*10 2*10 1# 2*10   Arm hold vs. RS at 90°? In S/L flex/abd 30\" ea  30\" ea ABC 1*   S/L ER  2*10 2*15 2*15   S/L abduction  2*10 2*10 man block of scap to avoid hike 2*10            STANDING      Table slide, flex  CW/CCW Incline 2*10 ea - 2*10 ea   scap squeezes arms down  Elbows bent  RTB 2*10  RTB 2*10 RTB 2*10  RTB 2*10 RTB 2*10  RTB 2*10   Bent over scaption for lower trap activation 2*10 2*10 manual assist  --   Wall saw with ball   3* ----   Shoulder flexion neutral to 45° with YTB   2*10 2*10   IR/ER with band    RTB 2*10 ea              PROPRIOCEPTION      Counter top weight shifts  10* ----                           MODALITIES                      Other Therapeutic Activities/Education:         Home Exercise Program:   practiced and pt demo ability to perform 2/17/2020         Manual Treatments:        Modalities:  none      Communication with other providers:  POC faxed 2/4/20      Assessment:   Lissa was able to progress exercises this date by adding more resistance as well as adding some new exercises. She remains weak and limited into elevation with weakness in the RTC, primarily into ER. She would continue to benefit from progression of strengthening exercises.     End session pain: 0/10 just tired and sore like she got a good work out             Plan for Next Session:  Continue to

## 2020-03-10 ENCOUNTER — HOSPITAL ENCOUNTER (OUTPATIENT)
Dept: PHYSICAL THERAPY | Age: 80
Setting detail: THERAPIES SERIES
Discharge: HOME | End: 2020-03-10
Payer: MEDICARE

## 2020-03-10 PROCEDURE — 97110 THERAPEUTIC EXERCISES: CPT

## 2020-03-10 PROCEDURE — 97530 THERAPEUTIC ACTIVITIES: CPT

## 2020-03-10 PROCEDURE — 97112 NEUROMUSCULAR REEDUCATION: CPT

## 2020-03-10 NOTE — FLOWSHEET NOTE
Outpatient Physical Therapy  [place]           [x] Phone: [phone]   Fax: [phone]  [place]           [] Phone: [phone]   Fax: [phone]        Physical Therapy Daily Treatment Note  Date:  3/10/2020    Patient Name:  Emily David    :  1940  MRN: [id number]  Restrictions/Precautions: Other position/activity restrictions: none  Diagnosis:   Diagnosis: R shoulder and arm pain  Date of Injury/Surgery:   Treatment Diagnosis: Treatment Diagnosis: R UE pain, stiffness, weakness    Insurance/Certification information: PT Insurance Information:  [facility]   Referring Physician:  Referring Practitioner: Marisa Sparks  Next Doctor Visit:    Plan of care signed (Y/N):  Y  Outcome Measure: DASH 36%  Visit# / total visits:    POC  Pain level: 0/10      Goals:        Short term goalsba  Time Frame for Short term goals: 3 weeks, 20  Short term goal 1: Pt will be able to report at least 25% reduction in pain Met   Short term goal 2: Pt will be able to raise arm > 120° w/ min pain Not met   Long term goals  Time Frame for Long term goals : 6 weeks, 3/13/20  Long term goal 1: Pt will be indpendent w/ HEP and able to continue to manage her pain after PT   Mostly met  Long term goal 2: Pt will be able to return to bowling w/o pain   Not met d/t hand numbness   Long term goal 3: Pt will have full strength R UE w/o pain for prior activty Not met    Long term goal 4: Pt will have full AROM for all reaching activity w/o pain Not met      Summary of Evaluation: Assessment: Pt is a 77 yo female who presents w/ R shoulder/arm/hand pain. She demonstrates limited and painful ROM w/ weakness also and + impingment signs as well. These limitations are affecting her ability to perform her usual activity and she will benefit from PT to help restore ROM, strength and activity w/o pain. Prior to 6 weeks ago she had min to no pain in R UE.           Subjective:   Pt arrives feeling like Treatments:        Modalities:  none      Communication with other providers:  POC faxed 2/4/20,  See PN 3/10/20      Assessment:   Lissa has been seen for 9 sessions of PT now focusing on ROM and strength. She has made nice progress towards her goals but does continue w/ weakness in R shoulder with limited elevation and ER. She would continue to benefit from progression of strengthening exercises. She is also struggling w/ numbness and weakness in her right hand w/ most of our testing coming back negative and would benefit from further medical evaluation for this issue. Pt would like to dc at this time and pursue further evaluation of her hand.       End session pain: 0/10 just tired and sore like she got a good work out             Plan for Next Session:   dc    Time In / Time Out:   1330/1420        Timed Code/Total Treatment Minutes:  50/50       1 TA 10' 1 NR 10' 1 TE 30'    Next Progress Note due:  See PN 3/10/20      Plan of Care Interventions:  [x] Therapeutic Exercise  [x] Modalities:  [x] Therapeutic Activity     [] Ultrasound  [x] Estim  [] Gait Training      [] Cervical Traction [] Lumbar Traction  [x] Neuromuscular Re-education    [x] Cold/hotpack [] Iontophoresis   [x] Instruction in HEP      [] Vasopneumatic   [] Dry Needling    [x] Manual Therapy               [] Aquatic Therapy              Electronically signed by:  Jazmin Sarmiento, PT, MPT, ATC 3/10/2020, 6:51 AM     3/10/2020, 2:40 PM

## 2020-03-10 NOTE — PROGRESS NOTES
Outpatient Physical Therapy           [place]           [x] Phone: [phone]   Fax: [phone]  [place]           [] Phone: [phone]   Fax: [phone]      To: Glo Quarles      From: Bautista Laws, PT     Patient: Shila Shoemaker                  : 1940  Diagnosis: R shoulder and arm pain    Date: 3/10/2020  Treatment Diagnosis: R UE pain, stiffness, weakness      []  Progress Note                [x]  Discharge Note    Evaluation Date:  2020 Total Visits to date:   9 Cancels/No-shows to date:  0    Subjective:  Pt arrives feeling like shoulder has gotten better, but numbness in her hand is bothering and no strength in hand either. Plan of Care/Treatment to date:  [x] Therapeutic Exercise    [x] Modalities:  [x] Therapeutic Activity     [] Ultrasound  [] Electrical Stimulation  [] Gait Training      [] Cervical Traction   [] Lumbar Traction  [x] Neuromuscular Re-education  [] Cold/hotpack [] Iontophoresis  [x] Instruction in HEP      Other:  [x] Manual Therapy       []  Vasopneumatic  [] Aquatic Therapy       []                          Objective/Significant Findings At Last Visit/Comments:  Seated shoulder AROM flexion 130 end range discomfort, abduction 95° with mild shoulder hike, ER scratch to back of head, IR scratch L1. PROM supine flex 155, abduction 175, ER 75, IR 85. MMT flex and abduction 4/5 w/ mild pain, ER 4-/5, IR 4+/5. Neg cervical compression, distraction and Spurlings. Neg Tinnel's at ulnar, median and radial nerve. Negative carpal tunnel test-Phalen's.  strength L 30#, R 15#. Negative neural tension testing R UE. 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 Assessment:  Lissa has been seen for 9 sessions of PT now focusing on ROM and strength. She has made nice progress towards her goals but does continue w/ weakness in R shoulder with limited elevation and ER.  She would continue to benefit from progression of strengthening exercises. She is also struggling w/ numbness and weakness in her right hand w/ most of our testing coming back negative and would benefit from further medical evaluation for this issue. Pt would like to dc at this time and pursue further evaluation of her hand. End session pain: 0/10 just tired and sore like she got a good work out        Goal Status:  [] Achieved [x] Partially Achieved  [] Not Achieved   Short term goals  Time Frame for Short term goals: 3 weeks, 2/21/20  Short term goal 1: Pt will be able to report at least 25% reduction in pain Met 2/20  Short term goal 2: Pt will be able to raise arm > 120° w/ min pain Not met 2/24  Long term goals  Time Frame for Long term goals : 6 weeks, 3/13/20  Long term goal 1: Pt will be indpendent w/ HEP and able to continue to manage her pain after PT   Mostly met  Long term goal 2: Pt will be able to return to bowling w/o pain   Not met d/t hand numbness   Long term goal 3: Pt will have full strength R UE w/o pain for prior activty Not met    Long term goal 4: Pt will have full AROM for all reaching activity w/o pain Not met      Frequency/Duration:  # Days per week: [] 1 day # Weeks: [] 1 week [] 4 weeks [] 8 weeks     [x] 2 days   [] 2 weeks [] 5 weeks [] 10 weeks     [] 3 days   [] 3 weeks [x] 6 weeks [] 12 weeks       Rehab Potential: [] Excellent [x] Good [] Fair  [] Poor         Patient Status: [] Continue per initial plan of Care     [x] Patient now discharged     [] Additional visits requested, Please re-certify for additional visits:      Requested frequency/duration:  /week for weeks    If we are requesting more visits, we fully anticipate the patient's condition is expected to improve within the treatment timeframe we are requesting.     Electronically signed by:  Bautista Laws, PT, MPT, ATC  3/10/2020, 6:53 AM    3/10/2020, 2:43 PM   If you have any questions or concerns, please don't hesitate to

## 2020-11-03 PROBLEM — R55 SYNCOPE: Status: RESOLVED | Noted: 2020-11-03 | Resolved: 2020-11-03

## 2022-03-24 ENCOUNTER — HOSPITAL ENCOUNTER (OUTPATIENT)
Dept: PHYSICAL THERAPY | Age: 82
Setting detail: THERAPIES SERIES
Discharge: HOME | End: 2022-03-24

## 2022-05-10 ENCOUNTER — HOSPITAL ENCOUNTER (OUTPATIENT)
Dept: PHYSICAL THERAPY | Age: 82
Setting detail: THERAPIES SERIES
Discharge: HOME | End: 2022-05-10
Payer: MEDICARE

## 2022-05-10 PROCEDURE — 97162 PT EVAL MOD COMPLEX 30 MIN: CPT

## 2022-05-10 PROCEDURE — 97110 THERAPEUTIC EXERCISES: CPT

## 2022-05-10 ASSESSMENT — PAIN SCALES - GENERAL: PAINLEVEL_OUTOF10: 4

## 2022-05-10 NOTE — FLOWSHEET NOTE
Outpatient Physical Therapy  [place]           [x] Phone: [phone]   Fax: [phone]  [facility]           [] Phone: [phone]   Fax: [phone]        Physical Therapy Daily Treatment Note  Date:  5/10/2022    Patient Name:  Grecia Dela Cruz    :  1940  MRN: [id number]  Restrictions/Precautions: No data recorded   WB Status: full  Diagnosis:   Left hip pain [M25.552] Diagnosis: L hip pain. Date of Injury/Surgery:   Treatment Diagnosis:  Chronic L hip pain. Insurance/Certification information: medicare  Referring Physician:  Dr. Mariam Sims   PCP: No primary care provider on file. Next Doctor Visit:    Plan of care signed (Y/N):  n  Outcome Measure: Hoos 11  Visit# / total visits:  1 /  Pain level: /10   Goals:     Patient goals: decrease pain,   Long Term Goals  Time Frame for Long Term Goals: 5 weeks  I in home program.  don/doff socks and shoes with minimal pain. on/off toilet with minimal pain. Summary of Evaluation:  Assessment: Pt presents with insidious onset of L hip pain 1+ years ago with symptoms worsening in the last 3 months. She notes pain and difficulty with don/doffing shoes and socks, bending to retrieve objects from floor. HIstory of B knee pain, chronic low back pain with past difficulty with steps/stairs. Subjective:  See eval         Any changes in Ambulatory Summary Sheet?   None        Objective:  See eval   COVID screening questions were asked and patient attested that there had been no contact or symptoms        Exercises: (No more than 4 columns)   Exercise/Equipment Date 5/10/22 #1 Date Date           WARM UP         Nu step            TABLE      bridge      clamshells      Adductor stretch      Gluteal stretch               STANDING      Standing hip flexor stretch 20 sec x 3 w UE support     Standing adductor stretch 20 sec x 3 w UE support     Heel raise      Trunk rotations      Elevated sit/stands                       PROPRIOCEPTION SLS UE support required 30 sec x 3                             MODALITIES                      Other Therapeutic Activities/Education:        Home Exercise Program:    Access Code: [email or web address]  URL: [email or web address]  Date: 05/10/2022  Prepared by: Zack Diaz    Exercises  Standing Hip Flexor Stretch - 3 x daily - 7 x weekly - 1 sets - 3 reps - 20 hold  Side Lunge Adductor Stretch - 3 x daily - 7 x weekly - 1 sets - 3 reps - 20 hold      Manual Treatments:        Modalities:        Communication with other providers:        Assessment:  (Response towards treatment session) (Pain Rating)  Assessment: Pt presents with insidious onset of L hip pain 1+ years ago with symptoms worsening in the last 3 months. She notes pain and difficulty with don/doffing shoes and socks, bending to retrieve objects from floor. HIstory of B knee pain, chronic low back pain with past difficulty with steps/stairs.       Plan for Next Session:        Time In / Time Out:      1345/1428         Timed Code/Total Treatment Minutes:  9/43      Next Progress Note due:        Plan of Care Interventions:  [x] Therapeutic Exercise  [] Modalities:  [x] Therapeutic Activity     [] Ultrasound  [] Estim  [x] Gait Training      [] Cervical Traction [] Lumbar Traction  [x] Neuromuscular Re-education    [] Cold/hotpack [] Iontophoresis   [x] Instruction in HEP      [] Vasopneumatic   [] Dry Needling    [x] Manual Therapy               [] Aquatic Therapy              Electronically signed by:  Chidi Houser PT, 5/10/2022, 2:19 PM

## 2022-05-10 NOTE — PROGRESS NOTES
Physical Therapy: Initial Evaluation    Patient: Babita Cardoso (1 y.o. female)   Examination Date:   Plan of Care Certification Period: 5/10/2022 to        :  1940 ;    Confirmed: Yes MRN: [id number]  CSN: [id number]   Insurance: Payor: MEDICARE / Plan: MEDICARE PART A AND B / Product Type: *No Product type* /   Insurance ID: [id number] - (Medicare) Secondary Insurance (if applicable):  EAST   Referring Physician: No ref. provider found     PCP: No primary care provider on file. Visits to Date/Visits Approved:   /      No Show/Cancelled Appts:   /       Medical Diagnosis: Left hip pain [M25.552] L hip pain. Treatment Diagnosis: Chronic L hip pain. PERTINENT MEDICAL HISTORY           Medical History:     Past Medical History:   Diagnosis Date    Arthritis     Emphysema of lung ([facility] [address]     Hypertension      Surgical History:   Past Surgical History:   Procedure Laterality Date    FRACTURE SURGERY  r elbow over 20 yrs ago       Medications:   Current Outpatient Medications:     levothyroxine (SYNTHROID) 75 MCG tablet, Take 75 mcg by mouth Daily, Disp: , Rfl:     pravastatin (PRAVACHOL) 40 MG tablet, Take 40 mg by mouth nightly, Disp: , Rfl:     naproxen (NAPROSYN) 375 MG tablet, Take 375 mg by mouth 2 times daily as needed for Pain, Disp: , Rfl:     metoprolol succinate (TOPROL XL) 50 MG extended release tablet, Take 50 mg by mouth daily, Disp: , Rfl:     docusate sodium (COLACE) 100 MG capsule, Take 100 mg by mouth 2 times daily as needed for Constipation, Disp: , Rfl:     cetirizine (ZYRTEC) 10 MG tablet, Take 10 mg by mouth daily as needed for Allergies, Disp: , Rfl:     losartan (COZAAR) 50 MG tablet, Take 50 mg by mouth daily, Disp: , Rfl:   Allergies: Sulfa antibiotics      SUBJECTIVE EXAMINATION      ,           Subjective History:    Subjective: L hip pain, progressively worsening. Sometimes pain is in groin, but usually lateral and posterior.   Pain and difficulty bending, reaching her feet for socks and shoes. posterior pain on/off toilet. Relates some chronic low back pain for years. Additional Pertinent Hx (if applicable): Chronic L hip pain for over a year, progressively worseing and has been severe the last 3 months. Prior diagnostic testing[de-identified] X-ray  Any changes to allergies, medications, or have you had any medical procedures/ER visits since your last visit?: No      Learning/Language: Learning  Does the patient/guardian have any barriers to learning?: No barriers  What is the preferred language of the patient/guardian?: English  Is an  required?: Yes  Is an  present?: No  How does the patient/guardian prefer to learn new concepts?: Listening,Reading,Demonstration,Pictures/Videos     Pain Screening   Pain Screening  Patient Currently in Pain: Yes  Pain Assessment: 0-10  Pain Level: 4  Best Pain Level: 2    Functional Status         Social History:  Social History  Lives With: Spouse  Type of Home: House  Home Layout: One level  Home Access: Stairs to enter with rails (uses rail or grab bars to enter house)    Occupation/Interests:  Occupation: Retired    Prior Level of Function:  able to reach feet, don socks/shoes without pain/difficulty. Independent        Current Level of Function:  gets assist with socks/shoes. ADL Assistance: Independent  Homemaking Assistance: Independent  Homemaking Responsibilities: Yes  Ambulation Assistance: Independent  Transfer Assistance: Independent  Active : Yes    OBJECTIVE EXAMINATION   Restrictions:        WB Status: full    Review of Systems:  Vision: Impaired  Hearing: Within functional limits  Overall Orientation Status: Within Functional Limits    VBI Screening / Lumbar Screening:        Regional Screen:   Lumbar Screen: decreased lordosis, stands in flexed posture. LSB increased LLE symptoms from hip to ankle. Flexion increased symptoms at calf, Extension increased groin pain. Observations:  General Observations  Description: mild edema in B legs. Palpation:    Tenderness at ant hip, greater trochanter    Ambulation/Gait (if applicable):  Ambulation  WB Status: full  Ambulation  Surface: uneven,level tile  Device: No Device  Quality of Gait: slow krystle    Left AROM  Right AROM                    Left PROM  Right PROM      General PROM LE:  (L hip flexion to ~100, IR 10deg, ER 25deg.)    General PROM LE:  (L hip flexion to ~100, IR 10deg, ER 25deg.)        Left Strength  Right Strength      General Strength Testing LE:  (Rhip flexion 4+, knee flexion 4/5. L hip flexion 4/5, knee ext 4/5 w pain, flexion 4+. )    General Strength Testing LE:  (Rhip flexion 4+, knee flexion 4/5. L hip flexion 4/5, knee ext 4/5 w pain, flexion 4+. )      Special Tests:   Special Tests for Hip  Hip Special Tests Performed:  (L FADIR increased groin pain, GAMA limited. + for quad and hip flexor tightness. Unable to SLS without UE support with mild increase L hip.)     Balance/Gait Assessment(s) Performed:  Unable to SLS, Tandem     ASSESSMENT     Impression: Assessment: Pt presents with insidious onset of L hip pain 1+ years ago with symptoms worsening in the last 3 months. She notes pain and difficulty with don/doffing shoes and socks, bending to retrieve objects from floor. HIstory of B knee pain, chronic low back pain with past difficulty with steps/stairs. Statement of Medical Necessity: Physical Therapy is both indicated and medically necessary as outlined in the POC to increase the likelihood of meeting the functionally related goals stated below. Patient's Activity Tolerance:        Patient's rehabilitation potential/prognosis is considered to be:  good    Factors which may impact rehabilitation potential include:  history of back problems.          GOALS   Patient Goal(s): decrease pain,  Long Term Goals Completed by 5 weeks Goal Status   I in home program.     don/doff socks and shoes with minimal pain. on/off toilet with minimal pain. TREATMENT PLAN            Pt. actively involved in establishing Plan of Care and Goals: Yes  Patient/ Caregiver education and instruction:               Treatment may include any combination of the following:       Frequency / Duration:  Patient to be seen   for   weeks      Therapist Signature: Dayana Burnett, PT    Date: 8/02/8149     I certify that the above Therapy Services are being furnished while the patient is under my care. I agree with the treatment plan and certify that this therapy is necessary. [de-identified] Signature:  ___________________________   Date:_______                                                                   No ref. provider found        Physician Comments: _______________________________________________    Please sign and return to   [facility]. Please fax to the location listed below.  Baylee Mitchell for this referral!    [address] [facility] [address], [address] [address]  Dept: [phone]  Loc: [phone]

## 2022-05-10 NOTE — PLAN OF CARE
Outpatient Physical Therapy                  [x] Phone: [phone]   Fax: [phone]    Pediatric Therapy                                    [] Phone: [phone]   Fax: [phone]  Pediatric [facility]                                      [] Phone: [phone]   Fax: [phone]      To:  Dr. Marquez Ag    From: Refugio Burrell, PT, PT     Patient: Trav Wyatt       : 1940  Diagnosis: L hip pain. Treatment Diagnosis: Chronic L hip pain. Date: 5/10/2022    Physical Therapy Certification/Re-Certification Form  Dear Dr. Marquez Ag  The following patient has been evaluated for physical therapy services and for therapy to continue, Please review the attached evaluation and/or summary of the patient's plan of care, and verify that you agree therapy should continue by signing the attached document and sending it back to our office. Patient is a  79 yo female who presents with L hip, low back and LE pain which impacts on adls ;patient's goal is to decrease pain, improve mobility and strength ;patient reports that pain  limits activities including donning socks, shoes, bending to floor, sit/stand transfers at times; PT to address patient's goals, impairments and activity limitations with skilled interventions checked in plan of care;patient's level of function prior to onset of symptoms was independent with some difficulty with stairs; did not observe any barriers to learning during PT eval; learning preferences include demonstration, practice, and handouts; patient expressed understanding of HEP; patient appears to be motivated to participate in an active PT program and to be compliant with HEP expectations;patient assisted in developing treatment plan and goals; no DME is currently being used;      Current functional level (based on HOOS)   :11    Assessment:  Assessment: Pt presents with insidious onset of L hip pain 1+ years ago with symptoms worsening in the last 3 months.   She notes pain and difficulty with don/doffing shoes and socks, bending to retrieve objects from floor. HIstory of B knee pain, chronic low back pain with past difficulty with steps/stairs. Plan of Care/Treatment to date:  [x] Therapeutic Exercise    [] Aquatics:  [x] Therapeutic Activity    [] Ultrasound  [] Elec Stimulation  [x] Gait Training     [] Cervical Traction [] Lumbar Traction  [x] Neuromuscular Re-education [] Cold/hotpack [] Iontophoresis   [] Instruction in HEP       [x] Manual Therapy     [] vasopneumatic            [x] Self care home management        []Dry needling trigger point point/pain management          Frequency/Duration:  # Days per week: [x] 1 day # Weeks: [] 1 week [x] 5 weeks     [] 2 days   [] 2 weeks [] 6 weeks     [] 3 days   [] 3 weeks [] 7 weeks     [] 4 days   [] 4 weeks [] 8 weeks    Rehab Potential/Progress: [] Excellent [x] Good [] Fair  [] Poor     Goals:       Long Term Goals  Time Frame for Long term goals : 5 weeks  Long term goal 1: I in home program.  Long term goal 2: don/doff socks and shoes with minimal pain. Long term goal 3: on/off toilet with minimal pain. Electronically signed by:  Kristin Gonzalez PT, 5/10/2022, 5:47 PM              If you have any questions or concerns, please don't hesitate to call.   Thank you for your referral.      Physician Signature:_________________Date:____________Time: ________  By signing above, therapists plan is approved by physician

## 2022-05-20 ENCOUNTER — HOSPITAL ENCOUNTER (OUTPATIENT)
Dept: PHYSICAL THERAPY | Age: 82
Setting detail: THERAPIES SERIES
Discharge: HOME | End: 2022-05-20
Payer: MEDICARE

## 2022-05-20 PROCEDURE — 97110 THERAPEUTIC EXERCISES: CPT

## 2022-05-20 PROCEDURE — 97140 MANUAL THERAPY 1/> REGIONS: CPT

## 2022-05-20 NOTE — FLOWSHEET NOTE
Outpatient Physical Therapy  [place]           [x] Phone: [phone]   Fax: [phone]  [place]           [] Phone: [phone]   Fax: [phone]        Physical Therapy Daily Treatment Note  Date:  2022    Patient Name:  Bri Amaro    :  1940  MRN: [id number]  Restrictions/Precautions: No data recorded      Diagnosis:   Left hip pain [M25.552] Diagnosis: L hip pain. Date of Injury/Surgery:   Treatment Diagnosis:  Chronic L hip pain. Insurance/Certification information: medicare  Referring Physician:  Dr. Bharat Vazquez   PCP: No primary care provider on file. Next Doctor Visit:    Plan of care signed (Y/N):  n  Outcome Measure: Hoos 11  Visit# / total visits:  2 /  Pain level: 2/10   Goals:     Patient goals: decrease pain,   Long Term Goals  Time Frame for Long Term Goals: 5 weeks  I in home program.  don/doff socks and shoes with minimal pain. on/off toilet with minimal pain. Summary of Evaluation:  Assessment: Pt presents with insidious onset of L hip pain 1+ years ago with symptoms worsening in the last 3 months. She notes pain and difficulty with don/doffing shoes and socks, bending to retrieve objects from floor. HIstory of B knee pain, chronic low back pain with past difficulty with steps/stairs. Subjective:  Pt arrives to tx session reporting 2/10 pain in L hip. Any changes in Ambulatory Summary Sheet?   None        Objective:  See eval   COVID screening questions were asked and patient attested that there had been no contact or symptoms        Exercises: (No more than 4 columns)   Exercise/Equipment Date 5/10/22 #1 22 #2 Date           WARM UP         Nu step  L2 5'          TABLE      bridge  x10    clamshells  x10    Adductor stretch  30\" x2 with TPR    Gluteal stretch  30\" x2             STANDING      Standing hip flexor stretch 20 sec x 3 w UE support     Standing adductor stretch 20 sec x 3 w UE support     Heel raise  2x10    Trunk rotations      Elevated sit/stands  x5                     PROPRIOCEPTION      SLS UE support required 30 sec x 3                             MODALITIES                      Other Therapeutic Activities/Education:        Home Exercise Program:    Access Code: [email or web address]  URL: [email or web address]  Date: 05/10/2022  Prepared by: Mica Braden    Exercises  Standing Hip Flexor Stretch - 3 x daily - 7 x weekly - 1 sets - 3 reps - 20 hold  Side Lunge Adductor Stretch - 3 x daily - 7 x weekly - 1 sets - 3 reps - 20 hold      Manual Treatments:  TPR to adductor muscles L hip      Modalities:        Communication with other providers:        Assessment:  Pt tolerates tx session fair without adverse reactions or complications. Pt appears to be somewhat self limiting and states that she does not believe that therapy will help her. Tolerates added exercises fair does relate some increased pain but visually does not appear to be in pain.           Plan for Next Session:        Time In / Time Out:    8881 / 1510      Timed Code/Total Treatment Minutes:  45' / 38'    2 TE    1 Man      Next Progress Note due:        Plan of Care Interventions:  [x] Therapeutic Exercise  [] Modalities:  [x] Therapeutic Activity     [] Ultrasound  [] Estim  [x] Gait Training      [] Cervical Traction [] Lumbar Traction  [x] Neuromuscular Re-education    [] Cold/hotpack [] Iontophoresis   [x] Instruction in HEP      [] Vasopneumatic   [] Dry Needling    [x] Manual Therapy               [] Aquatic Therapy              Electronically signed by:  Winter Infante PTA, 5/20/2022, 2:09 PM

## 2022-05-27 ENCOUNTER — HOSPITAL ENCOUNTER (OUTPATIENT)
Dept: PHYSICAL THERAPY | Age: 82
Setting detail: THERAPIES SERIES
Discharge: HOME | End: 2022-05-27
Payer: MEDICARE

## 2022-05-27 PROCEDURE — 97535 SELF CARE MNGMENT TRAINING: CPT

## 2022-05-27 PROCEDURE — 97110 THERAPEUTIC EXERCISES: CPT

## 2022-05-27 NOTE — FLOWSHEET NOTE
Outpatient Physical Therapy  [place]           [x] Phone: [phone]   Fax: [phone]  [place]           [] Phone: [phone]   Fax: [phone]        Physical Therapy Daily Treatment Note  Date:  2022    Patient Name:  Baylee Prasad    :  1940  MRN: [id number]  Restrictions/Precautions: No data recorded   Diagnosis:   Left hip pain [M25.552] Diagnosis: L hip pain. Date of Injury/Surgery:   Treatment Diagnosis:  Chronic L hip pain. Insurance/Certification information: medicare  Referring Physician:  Dr. Kenton Lizarraga   PCP: No primary care provider on file. Next Doctor Visit:    Plan of care signed (Y/N):  n  Outcome Measure: Hoos 11  Visit# / total visits:  3/5  Pain level: 2/10       Goals:     Patient goals: decrease pain,  Long Term Goals  Time Frame for Long Term Goals: 5 weeks  I in home program. Partially met   don/doff socks and shoes with minimal pain. on/off toilet with minimal pain. Summary of Evaluation:  Assessment: Pt presents with insidious onset of L hip pain 1+ years ago with symptoms worsening in the last 3 months. She notes pain and difficulty with don/doffing shoes and socks, bending to retrieve objects from floor. HIstory of B knee pain, chronic low back pain with past difficulty with steps/stairs. Subjective:  Lissa arrives to therapy stating that the hip is really hurting her and B knees are bothering her as well. She is just in pain and feels like it is getting worse, now getting some pain down into the shins bilaterally. States that she does exercises at home but unsure who gave them to her. The stretches really increase her pain. Any changes in Ambulatory Summary Sheet? None        Objective:  COVID screening questions were asked and patient attested that there had been no contact or symptoms    Cues for slow and controlled movements during exercises. Cues to reduce out toeing during lateral step ups.   Unable to tolerate hip flexor stretch off the edge of the table - too much pain in the groin to move her hip into enough abduction to get it off the table. Exercises: (No more than 4 columns)   Exercise/Equipment Date 5/10/22 #1 5/20/22 #2 5/27/2022 #3           WARM UP         Nu step  L2 5' L2 5'         TABLE      bridge  x10 10*2\"   clamshells  x10 RTB unilateral LE's R/L 10* ea   Add squeezes    10*2\"         Adductor stretch  30\" x2 with TPR Bent knee fall out to patient's comfort level    Gluteal stretch  30\" x2 --   Hip flexor stretch off edge of table    Unable to tolerate    Standing hip flexor stretch    2*30\" - gradual increase in stretch as tolerated             STANDING      Standing hip flexor stretch 20 sec x 3 w UE support  --   Standing adductor stretch 20 sec x 3 w UE support  --   Heel raise  2x10 2*10   Elevated sit/stands  x5 10*   Marches    2*10 alternating LE's B UE support    Lateral step up   4\" 2*10 L LE               PROPRIOCEPTION      SLS UE support required 30 sec x 3  --                           MODALITIES                      Other Therapeutic Activities/Education:  Discussed modifications for home stretches       Home Exercise Program:    Access Code: [email or web address]  URL: [email or web address]  Date: 05/10/2022  Prepared by: Sury Rico    Exercises  Standing Hip Flexor Stretch - 3 x daily - 7 x weekly - 1 sets - 3 reps - 20 hold  Side Lunge Adductor Stretch - 3 x daily - 7 x weekly - 1 sets - 3 reps - 20 hold      Manual Treatments:        Modalities:  None      Communication with other providers:  None       Assessment:  Lissa tolerated today's session well. She is painful and weak in the L hip. She has been having trouble with the stretches at home so we made some modifications to these to make them more tolerable.  End session pain: same     Plan for Next Session:       Time In / Time Out:  1415/1453    Timed Code/Total Treatment Minutes:  45' 1 ADL 10' 2 TE 28'      Next Progress Note due: Plan of Care Interventions:  [x] Therapeutic Exercise  [] Modalities:  [x] Therapeutic Activity     [] Ultrasound  [] Estim  [x] Gait Training      [] Cervical Traction [] Lumbar Traction  [x] Neuromuscular Re-education    [] Cold/hotpack [] Iontophoresis   [x] Instruction in HEP      [] Vasopneumatic   [] Dry Needling    [x] Manual Therapy               [] Aquatic Therapy              Electronically signed by:  Stephanie Diaz     5/27/2022, 6:55 AM

## 2022-06-03 ENCOUNTER — HOSPITAL ENCOUNTER (OUTPATIENT)
Dept: PHYSICAL THERAPY | Age: 82
Setting detail: THERAPIES SERIES
Discharge: HOME | End: 2022-06-03
Payer: MEDICARE

## 2022-06-03 PROCEDURE — 97110 THERAPEUTIC EXERCISES: CPT

## 2022-06-03 PROCEDURE — 97530 THERAPEUTIC ACTIVITIES: CPT

## 2022-06-03 NOTE — FLOWSHEET NOTE
TPR Bent knee fall out to patient's comfort level  Bent knee fall out   30\" x4   Gluteal stretch  30\" x2 --    Hip flexor stretch off edge of table    Unable to tolerate     Standing hip flexor stretch    2*30\" - gradual increase in stretch as tolerated     SLR    2x5                        STANDING       Standing hip flexor stretch 20 sec x 3 w UE support  --    Standing adductor stretch 20 sec x 3 w UE support  --    Heel raise  2x10 2*10 2x10   Elevated sit/stands  x5 10* X10 focus on eccentric control   Marches    2*10 alternating LE's B UE support  2x10   Lateral step up   4\" 2*10 L LE  4\" 2x10               PROPRIOCEPTION       SLS UE support required 30 sec x 3  --                                MODALITIES                         Other Therapeutic Activities/Education:  Discussed modifications for home stretches       Home Exercise Program:    Access Code: [email or web address]  URL: [email or web address]  Date: 05/10/2022  Prepared by: Nikkie Hickey    Exercises  Standing Hip Flexor Stretch - 3 x daily - 7 x weekly - 1 sets - 3 reps - 20 hold  Side Lunge Adductor Stretch - 3 x daily - 7 x weekly - 1 sets - 3 reps - 20 hold      Manual Treatments:        Modalities:  None      Communication with other providers:  None       Assessment:  Pt tolerates tx session well without adverse reactions or complications. Requires increased rest breaks due to fatigue. Will continue to benefit from PT to restore PLOF.     End session pain: same     Plan for Next Session:       Time In / Time Out:  1430 / 1510    Timed Code/Total Treatment Minutes:  36' / 40'    2 TE    1 TA      Next Progress Note due:        Plan of Care Interventions:  [x] Therapeutic Exercise  [] Modalities:  [x] Therapeutic Activity     [] Ultrasound  [] Estim  [x] Gait Training      [] Cervical Traction [] Lumbar Traction  [x] Neuromuscular Re-education    [] Cold/hotpack [] Iontophoresis   [x] Instruction in HEP      [] Vasopneumatic   [] Dry Needling    [x]

## 2022-06-09 ENCOUNTER — HOSPITAL ENCOUNTER (OUTPATIENT)
Dept: PHYSICAL THERAPY | Age: 82
Setting detail: THERAPIES SERIES
Discharge: HOME | End: 2022-06-09
Payer: MEDICARE

## 2022-06-09 PROCEDURE — 97140 MANUAL THERAPY 1/> REGIONS: CPT

## 2022-06-09 PROCEDURE — 97110 THERAPEUTIC EXERCISES: CPT

## 2022-06-09 NOTE — FLOWSHEET NOTE
Outpatient Physical Therapy  [place]           [x] Phone: [phone]   Fax: [phone]  [place]           [] Phone: [phone]   Fax: [phone]        Physical Therapy Daily Treatment Note  Date:  2022    Patient Name:  Eduar Sepulveda    :  1940  MRN: [id number]  Restrictions/Precautions: No data recorded   Diagnosis:   Left hip pain [M25.552] Diagnosis: L hip pain. Date of Injury/Surgery:   Treatment Diagnosis:  Chronic L hip pain. Insurance/Certification information: medicare  Referring Physician:  Dr. Rekha Joseph   PCP: No primary care provider on file. Next Doctor Visit:    Plan of care signed (Y/N):  n  Outcome Measure: Hoos 11  Visit# / total visits:    Pain level: 4/10       Goals:     Patient goals: decrease pain,  Long Term Goals  Time Frame for Long Term Goals: 5 weeks  I in home program. Partially met   don/doff socks and shoes with minimal pain.  still helps  on/off toilet with minimal pain. states she feels weak with transfers still. Summary of Evaluation:  Assessment: Pt presents with insidious onset of L hip pain 1+ years ago with symptoms worsening in the last 3 months. She notes pain and difficulty with don/doffing shoes and socks, bending to retrieve objects from floor. HIstory of B knee pain, chronic low back pain with past difficulty with steps/stairs. Subjective:   States she performs bed, chair and standing ex's at home. L hip is most painful. Any changes in Ambulatory Summary Sheet? None      Objective:  COVID screening questions were asked and patient attested that there had been no contact or symptoms    Relates post R hip pain with hip flexion, trunk flexion to reach feet. L groin pain with hip adduction. Hip flexion. L groin pain with hip supine to neutral Groin pain with L SLR. Pain and limited hip IR.         Peripheral edema B legs    Exercises: (No more than 4 columns)   Exercise/Equipment Date 5/10/22 #1 5/20/22 #2 5/27/2022 #3 6/3/22 #4 #5 6/9/22             WARM UP           Nu step  L2 5' L2 5' L3 5'            TABLE        bridge  x10 10*2\" 2X10 2\" 3x10   clamshells  x10 RTB unilateral LE's R/L 10* ea 2X10 RTB Green 2x15   Add squeezes    10*2\" 2 X10 2\"            Adductor stretch  30\" x2 with TPR Bent knee fall out to patient's comfort level  Bent knee fall out   30\" x4 To tolerance   Gluteal stretch  30\" x2 --  Poor tolerance   Hip flexor stretch off edge of table    Unable to tolerate      Standing hip flexor stretch    2*30\" - gradual increase in stretch as tolerated      SLR    2x5                            STANDING        Standing hip flexor stretch 20 sec x 3 w UE support  --     Standing adductor stretch 20 sec x 3 w UE support  --     Heel raise  2x10 2*10 2x10    Elevated sit/stands  x5 10* X10 focus on eccentric control    Marches    2*10 alternating LE's B UE support  2x10    Lateral step up   4\" 2*10 L LE  4\" 2x10                 PROPRIOCEPTION        SLS UE support required 30 sec x 3  --                                     MODALITIES                            Other Therapeutic Activities/Education:  Review home program       Home Exercise Program:    Access Code: [email or web address]  URL: [email or web address]  Date: 05/10/2022  Prepared by: Hamp Lent    Exercises  Standing Hip Flexor Stretch - 3 x daily - 7 x weekly - 1 sets - 3 reps - 20 hold  Side Lunge Adductor Stretch - 3 x daily - 7 x weekly - 1 sets - 3 reps - 20 hold    Access Code: [email or web address]  URL: [email or web address]/  Date: 06/09/2022  Prepared by: Hamp Lent    Exercises  Supine Bridge - 1 x daily - 7 x weekly - 3 sets - 10 reps  Bent Knee Fallouts - 1 x daily - 7 x weekly - 3 sets - 10 reps  Heel rises with counter support - 1 x daily - 7 x weekly - 2 sets - 10 reps  Mini Squat with Counter Support - 1 x daily - 7 x weekly - 2 sets - 10 reps  Standing Hip Abduction with Counter Support - 1 x daily - 7 x weekly - 2 sets - 10 reps        Manual Treatments: LLE distraction, TPR to adductor, gluteal, hip flexors. HIp IR, ER stretching manually with poor tolerance to IR stretch. Modalities:  None      Communication with other providers:  None       Assessment:  Pt tolerates tx session well without adverse reactions or complications. Pt wishes to have additional consultation for her symptoms at this time.      End session pain: same 4/10    Plan for Next Session:       Time In / Time Out:  1415/1458    Timed Code/Total Treatment Minutes:  43/43      Next Progress Note due:        Plan of Care Interventions:  [x] Therapeutic Exercise  [] Modalities:  [x] Therapeutic Activity     [] Ultrasound  [] Estim  [x] Gait Training      [] Cervical Traction [] Lumbar Traction  [x] Neuromuscular Re-education    [] Cold/hotpack [] Iontophoresis   [x] Instruction in HEP      [] Vasopneumatic   [] Dry Needling    [x] Manual Therapy               [] Aquatic Therapy              Electronically signed by:  Esther Jaimes, PT      6/9/2022, 2:21 PM

## 2022-06-09 NOTE — PROGRESS NOTES
Outpatient Physical Therapy        [x] Phone: [phone]   Fax: [phone]  Physician:   Dr. Baylee Godinez        From: [facility],     Patient: Aixa Guajardo                    : 1940    Diagnosis:   Left hip pain [M25.552]   Treatment Diagnosis:  Chronic L hip pain. Date: 2022    [x]  Progress Note                []  Discharge Note    Total Visits to date:  5     Cancels/No-shows to date:       Subjective:  Pt states her pain remains the same, worst at L groin and posterior hip. 4/10    Prominent Objective Findings:    Relates post R hip pain with hip flexion, trunk flexion when she reaches toward her feet. .    L groin pain with hip adduction, as well as hip flexion. L groin pain with hip supine to neutral hip posture (position of comfort ~60 flexion, abduction, ER). Groin pain with L SLR. Pain and limited hip IR L hip. Pain at L groin with resisted hip testing. Pain limited strength testing.       Gait is with SPC, small shuffling steps with wide RAMON. Peripheral edema B legs  Assessment:     Time Frame for Long Term Goals: 5 weeks  I in home program. Partially met   don/doff socks and shoes with minimal pain.  still helps  on/off toilet with minimal pain. states she feels weak with transfers still. Goal Status:  [] Achieved [x] Partially Achieved  [] Not Achieved      Planned Services:  [x] Therapeutic Exercise    [] Modalities:  [x] Therapeutic Activity     [] Ultrasound  [] Electric Stimulation  [] Gait Training      [] Cervical Traction    [] Lumbar Traction  [] Neuromuscular Re-education  [] Cold/hotpack [] Iontophoresis  [x] Instruction in HEP      Other:  [x] Manual Therapy       []  Vasopneumatic  [] Self care management                           [] Dry needling trigger point/pain management                ?        Patient Status: [] Continue per initial Plan of Care     [x] Hold:  Patient  Wishes to follow up at your office for additional consultation     [] Additional visits requested, Please re-certify for additional visits:        Electronically signed by:  Maira Villar, PT 6/9/2022, 4:34 PM    If you have any questions or concerns, please don't hesitate to call.   Thank you for your referral.

## 2022-07-12 NOTE — PROGRESS NOTES
Outpatient Physical Therapy        [x] Phone: [phone]   Fax: [phone]  Physician:   Dr. Cody Hawkins        From: Raf Conn, PT,     Patient: Yanni Bhatti                    : 1940    Diagnosis:   Left hip pain [M25.552]   Treatment Diagnosis:  Chronic L hip pain. Date: 2022    []  Progress Note                [x]  Discharge Note    Total Visits to date:  5     Cancels/No-shows to date:       Subjective:  Pt states her pain remains the same, worst at L groin and posterior hip. 4/10    Prominent Objective Findings:    Relates post R hip pain with hip flexion, trunk flexion when she reaches toward her feet. .    L groin pain with hip adduction, as well as hip flexion. L groin pain with hip supine to neutral hip posture (position of comfort ~60 flexion, abduction, ER). Groin pain with L SLR. Pain and limited hip IR L hip. Pain at L groin with resisted hip testing. Pain limited strength testing.       Gait is with SPC, small shuffling steps with wide RAMON. Peripheral edema B legs  Assessment:     Time Frame for Long Term Goals: 5 weeks  I in home program. Partially met   don/doff socks and shoes with minimal pain.  still helps  on/off toilet with minimal pain. states she feels weak with transfers still. Goal Status:  [] Achieved [x] Partially Achieved  [] Not Achieved      Planned Services:  [x] Therapeutic Exercise    [] Modalities:  [x] Therapeutic Activity     [] Ultrasound  [] Electric Stimulation  [] Gait Training      [] Cervical Traction    [] Lumbar Traction  [] Neuromuscular Re-education  [] Cold/hotpack [] Iontophoresis  [x] Instruction in HEP      Other:  [x] Manual Therapy       []  Vasopneumatic  [] Self care management                           [] Dry needling trigger point/pain management                ?        Patient Status: [] Continue per initial Plan of Care     [] D/C pt has not returned     [] Additional visits requested, Please re-certify for additional visits:        Electronically signed by:  Parminder Leigh, PT 7/12/2022, 8:57 AM    If you have any questions or concerns, please don't hesitate to call.   Thank you for your referral.